# Patient Record
(demographics unavailable — no encounter records)

---

## 2024-12-10 NOTE — PHYSICAL EXAM
[NI] : Normal [de-identified] : Left hand: Normal cascade. Approximated, longitudinal lacerations on the thenar eminence and over the ring finger metacarpal. The thenar lacerations are 2cm apart distally and 1.5cm apart proximally. Thenar sutures are unraveling. The intervening skin has some mottled hyperemia but is viable. No surrounding erythema. Full digital extension. Near-full composite finger flexion. Ring finger fingertip reaches thenar eminence with effort. Sensation intact in the median, radial, ulnar distributions, including radial and ulnar aspects of RF and thumb. FDS, FDP, FPL intact.

## 2024-12-10 NOTE — ASSESSMENT
[FreeTextEntry1] : 42-year-old male status post left hand laceration repairs 9 days ago  Explained that he does not have any sign of infection. The red coloration is likely due to interruption of the bloodflow to the thenar skin between the lacerations. The skin is viable, and the color will improve with time. He is on the early end of where I would normally be comfortable removing sutures, but the thenar sutures are already coming undone. Discussed that we can remove the sutures and place Steri-Strips today as a small amount of reinforcement while his wounds continue to heal. Explained these will slowly fall off with time. He would be able to wash his hands. He was amenable to this. Sutures removed without difficulty. Repairs remained intact. Steri-strips placed. No lifting greater than 5 pounds while his wounds heal. Provided a link to a YouTube video on finger exercises. Follow-up in 2-3 weeks for wound and ROM evaluation.

## 2024-12-10 NOTE — HISTORY OF PRESENT ILLNESS
[FreeTextEntry1] : Mr. Dutton is a 42-year-old male, RHD, who presents for follow-up after laceration repairs of his left hand. 9 days ago, he was slicing an iberico ham when the knife slipped and cut his left hand. He sustained two almost parallel lacerations on the thenar eminence and one over the ring finger metacarpal. Xrays were negative. He underwent repair of the thenar lacerations at urgent care and repair of the remaining laceration at the [facility] by Dr. Lázaro Phillip. He was instructed to follow-up in 2 weeks for suture removal but called for an earlier appointment due to a red appearance of the thenar skin and concern for infection. He reports mild tenderness of the thenar skin when pressed but otherwise it is not painful. One of the thenar sutures has already fallen out. The Plastic Surgeon who sutured his hand is out-of-network so he scheduled an appointment with me. Reports a feeling of fullness in his ring fingertip but otherwise has normal sensation. He is starting to experience some stiffness.  No prior hand injuries or surgeries. Does office work. History of HTN, hypercholesterolemia. Denies prior surgery. 5 cigarettes per day.

## 2024-12-10 NOTE — DATA REVIEWED
[FreeTextEntry1] : Three views of the left hand were reviewed from 12/1/24. No evidence of fracture/dislocation. No foreign body. Joint spaces are well maintained.

## 2025-01-02 NOTE — ASSESSMENT
[FreeTextEntry1] : 42-year-old male status post repair of left hand lacerations  Lacerations are healing well. Start scar massage. No restriction on activity. The ulnar digital nerve of the ring finger is likely intact but irritated in some scar tissue. If it were cut, he would likely have much worse sensation at this point. The procedure note from Dr. Phillip does not specify inspection of the digital nerves. Will monitor. Follow-up 1 month to eval scar tissue and sensation

## 2025-01-02 NOTE — PHYSICAL EXAM
[NI] : Normal [de-identified] : Left hand: Normal cascade. Full digital flexion-extension. Lacerations on the thenar eminence and over the palmar RF metacarpal well-healed with firm underlying scar tissue. Intact subjective sensation on the radial aspect of the RF, reduced but present on the ulnar aspect of the RF. Rates ulnar aspect 4/10.

## 2025-01-02 NOTE — HISTORY OF PRESENT ILLNESS
[FreeTextEntry1] : Mr. Dutton presents for follow-up status post repair of left hand lacerations 12/01/24. He reports some firm scar tissue and reduced sensation on the ulnar aspect of his RF. Also has some stiffness with RF extension but otherwise has full use of his fingers.